# Patient Record
Sex: MALE | Race: WHITE | Employment: STUDENT | URBAN - NONMETROPOLITAN AREA
[De-identification: names, ages, dates, MRNs, and addresses within clinical notes are randomized per-mention and may not be internally consistent; named-entity substitution may affect disease eponyms.]

---

## 2022-06-17 ENCOUNTER — NURSE ONLY (OUTPATIENT)
Dept: CARDIOLOGY CLINIC | Age: 22
End: 2022-06-17
Payer: COMMERCIAL

## 2022-06-17 DIAGNOSIS — Z02.5 SPORTS PHYSICAL: Primary | ICD-10-CM

## 2022-06-17 PROCEDURE — 93000 ELECTROCARDIOGRAM COMPLETE: CPT | Performed by: INTERNAL MEDICINE

## 2022-06-20 ENCOUNTER — OFFICE VISIT (OUTPATIENT)
Dept: ORTHOPEDIC SURGERY | Age: 22
End: 2022-06-20
Payer: COMMERCIAL

## 2022-06-20 VITALS — BODY MASS INDEX: 28.93 KG/M2 | WEIGHT: 250 LBS | HEIGHT: 78 IN

## 2022-06-20 DIAGNOSIS — Z02.5 ROUTINE SPORTS PHYSICAL EXAM: Primary | ICD-10-CM

## 2022-06-20 PROCEDURE — 99203 OFFICE O/P NEW LOW 30 MIN: CPT | Performed by: EMERGENCY MEDICINE

## 2022-06-20 ASSESSMENT — ENCOUNTER SYMPTOMS
COUGH: 0
ABDOMINAL PAIN: 0
SHORTNESS OF BREATH: 0
RHINORRHEA: 0

## 2022-06-20 NOTE — PROGRESS NOTES
NEW PATIENT VISIT  CC: Annual Exam (Sports physical)    Referring Provider: No ref. provider found    HPI:    Carmen Steven is a 25 y.o. male who presents for a preparticipation physical exam.    He is a transfer from [employer]. He plans to play football at [employer]. He reports that he had a right quad tendon strain earlier this year but has no issues from that. Back in high school, he did have an episode of lightheadedness and syncope. He was seen by cardiologist and had a thorough work-up including EKG and echocardiogram.  He was cleared by the cardiologist.  He has had no recurrent episodes of this. His sister has a genetic disorder, with associated hole in her heart that was repaired. Patient has no cardiac issues. He has otherwise been healthy and without any other complaints. Cardiac history: No near syncope or syncope during or after exercise. No chest pain or discomfort during or after exercise. No palpitations during or after exercise. Never been diagnosed with a heart condition. No prior cardiac testing. No lightheadedness or shortness of breath during or after exercise. No unexplained seizures. Family cardiac history: No family history of sudden cardiac death. No family history of heart conditions including hypertrophic cardiomyopathy, arrhythmogenic right ventricular cardiomyopathy, long QT syndrome, short QT syndrome, Brugada syndrome. No family history of heart disease. No family history of unexplained syncope, near syncope, seizures, or near drowning. Musculoskeletal history: No history of bone, tendon, ligament, or muscle injuries. No history of arthritis. No history of neck injury or neck pain. Does not wear any orthotics or braces. Further medical history: No history of asthma or wheezing; does not use an inhaler; no family history of asthma. No missing organs. No groin pain or concern for hernia. No history of mono. No rashes.   No history of MRSA or herpes. No prior concussion or head injury. No seizure disorder. No numbness or tingling. No exertional heatstroke. No history or family history of sickle cell disease or trait. No issues with vision. Does not wear glasses or contact lenses or any protective eyewear. No concerns about his weight. No history of a eating disorder. History reviewed. No pertinent past medical history. Social History  Plays football at [employer]    Medications  No current outpatient medications on file. No current facility-administered medications for this visit. Allergies  No Known Allergies    Review of Systems:  Review of Systems   Constitutional: Negative for chills and fever. HENT: Negative for congestion and rhinorrhea. Respiratory: Negative for cough and shortness of breath. Cardiovascular: Negative for chest pain. Gastrointestinal: Negative for abdominal pain. Musculoskeletal: Negative for joint swelling and myalgias. Skin: Negative for rash. Neurological: Negative for dizziness and light-headedness. Physical Examination:  General: Well appearing, well nourished, in no distress  Skin: Good turgor, no rash, no unusual bruising  HEENT:  Head: Normocephalic, atraumatic, no visible or palpable abnormalities  Eyes: Visual acuity intact, conjunctiva clear, sclera non-icteric, EOM intact, anisocoria with left pupil greater than right, round and reactive to light bilaterally  Ears: EACs clear, TMs normal bilaterally, hearing intact.   Nose: Normal  Mouth: Moist mucous abundance, no mucosal lesions  Pharynx: No posterior pharyngeal erythema, uvula midline and nonedematous  Neck: Supple, no midline bony spinous tenderness to palpation, full range of motion without pain  Heart: No cardiomegaly or thrills; regular rate and rhythm, no murmurs  Lungs: Clear to auscultation bilaterally  Abdomen: Soft, nondistended, nontender to palpation, no organomegaly or masses, no hernias  Back: No midline bony spinous tenderness to palpation, normal range of motion  Musculoskeletal: Normal gait. No gross deformity. Full range of motion all joints of bilateral upper and lower extremity, full strength of all joints bilateral upper and lower extremities, no edema, intact distal pulses. Pelvis stable to AP and lateral compression. Neurologic: Awake alert and oriented x4, no focal neurologic deficits, normal speech, cranial nerves II through XII intact on exam, normal motor function, normal sensory function  Psychiatric: Normal mood, normal affect, normal judgment  Lymph: No lymphadenopathy    Radiology: no new imaging    EKG dated 6/17/2022: Sinus bradycardia, rate 43, normal axis, normal intervals, no acute ischemic changes or arrhythmias, no T wave inversions    Assessment/Treatment Plan: Nishant Roldan is a 25 y.o. male with:    1. Routine sports physical exam      The patient is seen and examined in the clinic today. Presenting for a preparticipation physical exam to participate in [employer]. He has no acute abnormalities on my thorough evaluation today. Therefore, at this time, he is cleared to participate in physical activity. EKG was reviewed with the patient at the bedside. No additional testing. No orders of the defined types were placed in this encounter. Follow-up:   Return if symptoms worsen or fail to improve. Sooner with any problems, questions, concerns, or worsening symptoms. Electronically signed by Ted Snell MD on 6/20/2022 at 1:20 PM.    Disclaimer: This note was dictated with voice recognition software. Though review and correction are routine, we apologize for any errors.

## 2022-07-14 ENCOUNTER — OFFICE VISIT (OUTPATIENT)
Dept: ORTHOPEDIC SURGERY | Age: 22
End: 2022-07-14
Payer: COMMERCIAL

## 2022-07-14 VITALS
HEART RATE: 79 BPM | HEIGHT: 78 IN | DIASTOLIC BLOOD PRESSURE: 64 MMHG | BODY MASS INDEX: 28.6 KG/M2 | SYSTOLIC BLOOD PRESSURE: 116 MMHG | WEIGHT: 247.2 LBS | OXYGEN SATURATION: 98 %

## 2022-07-14 DIAGNOSIS — B35.1 ONYCHOMYCOSIS: Primary | ICD-10-CM

## 2022-07-14 DIAGNOSIS — B35.3 TINEA PEDIS OF BOTH FEET: ICD-10-CM

## 2022-07-14 PROCEDURE — 99204 OFFICE O/P NEW MOD 45 MIN: CPT | Performed by: STUDENT IN AN ORGANIZED HEALTH CARE EDUCATION/TRAINING PROGRAM

## 2022-07-14 NOTE — PROGRESS NOTES
CC:   Chief Complaint   Patient presents with    Follow-up     Toenail fungus. Has a cream but isn't sure it's the best option. HPI  Cierra Echols is a 25 y.o. male here for toenail fungus. He has had toenail fungus for a few years now. When he was previously at [facility] before transferring here he been put on some creams for this. He states that he had not noticed any significant improvement and feels that these creams were of the over-the-counter variety, but he does not recall for certain. He does ask about other treatment options. He has had toenails fall off in the past.  He does not have pain or itching typically. Review of Systems  As above. Vitals:    07/14/22 1050   BP: 116/64   Site: Right Upper Arm   Position: Sitting   Cuff Size: Small Adult   Pulse: 79   SpO2: 98%   Weight: 247 lb 3.2 oz (112.1 kg)   Height: 6' 7\" (2.007 m)     Physical Exam  Vitals reviewed. Constitutional:       General: He is not in acute distress. Appearance: He is not ill-appearing. HENT:      Head: Normocephalic and atraumatic. Nose: No congestion. Eyes:      Extraocular Movements: Extraocular movements intact. Conjunctiva/sclera: Conjunctivae normal.   Pulmonary:      Effort: Pulmonary effort is normal. No respiratory distress. Musculoskeletal:      Cervical back: Normal range of motion. Skin:     General: Skin is warm and dry. Comments: Onychomycosis on all toe-nails bilaterally. Most severe on right first toe. There is also evidence of tinea pedis on the skin between the toenails and plantar surface of the bilateral feet. Neurological:      Mental Status: He is alert and oriented to person, place, and time. Psychiatric:         Mood and Affect: Mood normal.         Behavior: Behavior normal.         A/P  1.  Onychomycosis  We discussed the potential treatment options for onychomycosis and tinea pedis, but given that these medications are relatively toxic and he is playing